# Patient Record
Sex: FEMALE | Race: WHITE | Employment: FULL TIME | URBAN - METROPOLITAN AREA
[De-identification: names, ages, dates, MRNs, and addresses within clinical notes are randomized per-mention and may not be internally consistent; named-entity substitution may affect disease eponyms.]

---

## 2017-07-14 ENCOUNTER — HOSPITAL ENCOUNTER (OUTPATIENT)
Dept: PREADMISSION TESTING | Age: 52
Discharge: HOME | End: 2017-07-14
Payer: COMMERCIAL

## 2017-07-14 VITALS
OXYGEN SATURATION: 98 % | DIASTOLIC BLOOD PRESSURE: 70 MMHG | BODY MASS INDEX: 28.85 KG/M2 | TEMPERATURE: 96.9 F | RESPIRATION RATE: 20 BRPM | SYSTOLIC BLOOD PRESSURE: 124 MMHG | WEIGHT: 169 LBS | HEART RATE: 64 BPM | HEIGHT: 64 IN

## 2017-07-14 DIAGNOSIS — M77.50 TENDONITIS OF FOOT: Chronic | ICD-10-CM

## 2017-07-14 DIAGNOSIS — R42 VERTIGO: ICD-10-CM

## 2017-07-14 LAB
HCT VFR BLD CALC: 42.2 % (ref 37–47)
HEMOGLOBIN: 14.3 G/DL (ref 12–16)
MCH RBC QN AUTO: 32.2 PG (ref 27–31.3)
MCHC RBC AUTO-ENTMCNC: 33.8 % (ref 33–37)
MCV RBC AUTO: 95.1 FL (ref 82–100)
PDW BLD-RTO: 14.8 % (ref 11.5–14.5)
PLATELET # BLD: 232 K/UL (ref 130–400)
RBC # BLD: 4.43 M/UL (ref 4.2–5.4)
WBC # BLD: 7.9 K/UL (ref 4.8–10.8)

## 2017-07-14 PROCEDURE — 36415 COLL VENOUS BLD VENIPUNCTURE: CPT

## 2017-07-14 PROCEDURE — 85027 COMPLETE CBC AUTOMATED: CPT

## 2017-07-14 PROCEDURE — 93005 ELECTROCARDIOGRAM TRACING: CPT

## 2017-07-14 RX ORDER — DULOXETIN HYDROCHLORIDE 30 MG/1
30 CAPSULE, DELAYED RELEASE ORAL
COMMUNITY

## 2017-07-14 RX ORDER — SODIUM CHLORIDE 0.9 % (FLUSH) 0.9 %
10 SYRINGE (ML) INJECTION EVERY 12 HOURS SCHEDULED
Status: CANCELLED | OUTPATIENT
Start: 2017-07-14

## 2017-07-14 RX ORDER — SODIUM CHLORIDE 0.9 % (FLUSH) 0.9 %
10 SYRINGE (ML) INJECTION PRN
Status: CANCELLED | OUTPATIENT
Start: 2017-07-14

## 2017-07-14 RX ORDER — LEVOTHYROXINE SODIUM 0.05 MG/1
50 TABLET ORAL
COMMUNITY
Start: 2014-04-16

## 2017-07-14 RX ORDER — LIDOCAINE HYDROCHLORIDE 10 MG/ML
1 INJECTION, SOLUTION EPIDURAL; INFILTRATION; INTRACAUDAL; PERINEURAL
Status: CANCELLED | OUTPATIENT
Start: 2017-07-14 | End: 2017-07-14

## 2017-07-14 RX ORDER — SODIUM CHLORIDE, SODIUM LACTATE, POTASSIUM CHLORIDE, CALCIUM CHLORIDE 600; 310; 30; 20 MG/100ML; MG/100ML; MG/100ML; MG/100ML
INJECTION, SOLUTION INTRAVENOUS CONTINUOUS
Status: CANCELLED | OUTPATIENT
Start: 2017-07-14

## 2017-07-14 RX ORDER — IBUPROFEN 200 MG
200 TABLET ORAL EVERY 6 HOURS PRN
COMMUNITY

## 2017-07-14 RX ORDER — ATORVASTATIN CALCIUM 20 MG/1
20 TABLET, FILM COATED ORAL DAILY
COMMUNITY

## 2017-07-14 ASSESSMENT — ENCOUNTER SYMPTOMS
EYES NEGATIVE: 1
NAUSEA: 0
VOMITING: 0
GASTROINTESTINAL NEGATIVE: 1
DIARRHEA: 0
COUGH: 1
WHEEZING: 0
SHORTNESS OF BREATH: 0
SORE THROAT: 0
CONSTIPATION: 0
EYE PAIN: 0
BACK PAIN: 0
BLURRED VISION: 0
SPUTUM PRODUCTION: 1
HEARTBURN: 0
HEMOPTYSIS: 0
DOUBLE VISION: 0

## 2017-07-19 LAB
EKG ATRIAL RATE: 57 BPM
EKG P AXIS: 48 DEGREES
EKG P-R INTERVAL: 128 MS
EKG Q-T INTERVAL: 414 MS
EKG QRS DURATION: 84 MS
EKG QTC CALCULATION (BAZETT): 402 MS
EKG R AXIS: 44 DEGREES
EKG T AXIS: 48 DEGREES
EKG VENTRICULAR RATE: 57 BPM

## 2024-08-19 ENCOUNTER — OFFICE VISIT (OUTPATIENT)
Dept: UROLOGY | Facility: CLINIC | Age: 59
End: 2024-08-19
Payer: COMMERCIAL

## 2024-08-19 VITALS
HEIGHT: 64 IN | DIASTOLIC BLOOD PRESSURE: 81 MMHG | HEART RATE: 64 BPM | SYSTOLIC BLOOD PRESSURE: 121 MMHG | WEIGHT: 174 LBS | TEMPERATURE: 98 F | BODY MASS INDEX: 29.71 KG/M2

## 2024-08-19 DIAGNOSIS — R31.29 OTHER MICROSCOPIC HEMATURIA: ICD-10-CM

## 2024-08-19 LAB
APPEARANCE UR: CLEAR
BILIRUB UR STRIP.AUTO-MCNC: NEGATIVE MG/DL
CAOX CRY #/AREA UR COMP ASSIST: NORMAL /HPF
COLOR UR: YELLOW
GLUCOSE UR STRIP.AUTO-MCNC: NORMAL MG/DL
KETONES UR STRIP.AUTO-MCNC: NEGATIVE MG/DL
LEUKOCYTE ESTERASE UR QL STRIP.AUTO: ABNORMAL
MUCOUS THREADS #/AREA URNS AUTO: NORMAL /LPF
NITRITE UR QL STRIP.AUTO: NEGATIVE
PH UR STRIP.AUTO: 5 [PH]
POC APPEARANCE, URINE: CLEAR
POC BILIRUBIN, URINE: NEGATIVE
POC BLOOD, URINE: ABNORMAL
POC COLOR, URINE: YELLOW
POC GLUCOSE, URINE: NEGATIVE MG/DL
POC KETONES, URINE: NEGATIVE MG/DL
POC LEUKOCYTES, URINE: ABNORMAL
POC NITRITE,URINE: NEGATIVE
POC PH, URINE: 5.5 PH
POC PROTEIN, URINE: NEGATIVE MG/DL
POC SPECIFIC GRAVITY, URINE: >=1.03
POC UROBILINOGEN, URINE: 0.2 EU/DL
PROT UR STRIP.AUTO-MCNC: NEGATIVE MG/DL
RBC # UR STRIP.AUTO: ABNORMAL /UL
RBC #/AREA URNS AUTO: NORMAL /HPF
SP GR UR STRIP.AUTO: 1.02
SQUAMOUS #/AREA URNS AUTO: NORMAL /HPF
UROBILINOGEN UR STRIP.AUTO-MCNC: NORMAL MG/DL
WBC #/AREA URNS AUTO: NORMAL /HPF

## 2024-08-19 PROCEDURE — 99213 OFFICE O/P EST LOW 20 MIN: CPT | Performed by: UROLOGY

## 2024-08-19 PROCEDURE — 3008F BODY MASS INDEX DOCD: CPT | Performed by: UROLOGY

## 2024-08-19 PROCEDURE — 4004F PT TOBACCO SCREEN RCVD TLK: CPT | Performed by: UROLOGY

## 2024-08-19 PROCEDURE — 81003 URINALYSIS AUTO W/O SCOPE: CPT | Performed by: UROLOGY

## 2024-08-19 PROCEDURE — 81001 URINALYSIS AUTO W/SCOPE: CPT | Performed by: UROLOGY

## 2024-08-19 PROCEDURE — 99203 OFFICE O/P NEW LOW 30 MIN: CPT | Performed by: UROLOGY

## 2024-08-19 RX ORDER — DULOXETIN HYDROCHLORIDE 60 MG/1
CAPSULE, DELAYED RELEASE ORAL
COMMUNITY
Start: 2024-06-17

## 2024-08-19 RX ORDER — METOPROLOL SUCCINATE 25 MG/1
1 TABLET, EXTENDED RELEASE ORAL
COMMUNITY
Start: 2024-08-06

## 2024-08-19 RX ORDER — INDOMETHACIN 25 MG/1
CAPSULE ORAL
COMMUNITY
Start: 2024-08-17

## 2024-08-19 RX ORDER — LEVOTHYROXINE SODIUM 50 UG/1
TABLET ORAL
COMMUNITY
Start: 2024-08-16

## 2024-08-19 RX ORDER — ROSUVASTATIN CALCIUM 40 MG/1
1 TABLET, COATED ORAL
COMMUNITY
Start: 2024-08-06

## 2024-08-19 SDOH — ECONOMIC STABILITY: FOOD INSECURITY: WITHIN THE PAST 12 MONTHS, YOU WORRIED THAT YOUR FOOD WOULD RUN OUT BEFORE YOU GOT MONEY TO BUY MORE.: NEVER TRUE

## 2024-08-19 SDOH — ECONOMIC STABILITY: FOOD INSECURITY: WITHIN THE PAST 12 MONTHS, THE FOOD YOU BOUGHT JUST DIDN'T LAST AND YOU DIDN'T HAVE MONEY TO GET MORE.: NEVER TRUE

## 2024-08-19 ASSESSMENT — PATIENT HEALTH QUESTIONNAIRE - PHQ9
SUM OF ALL RESPONSES TO PHQ9 QUESTIONS 1 AND 2: 0
2. FEELING DOWN, DEPRESSED OR HOPELESS: NOT AT ALL
1. LITTLE INTEREST OR PLEASURE IN DOING THINGS: NOT AT ALL

## 2024-08-19 ASSESSMENT — ENCOUNTER SYMPTOMS
LOSS OF SENSATION IN FEET: 0
DEPRESSION: 0
OCCASIONAL FEELINGS OF UNSTEADINESS: 0

## 2024-08-19 ASSESSMENT — LIFESTYLE VARIABLES
AUDIT-C TOTAL SCORE: 3
HOW OFTEN DO YOU HAVE SIX OR MORE DRINKS ON ONE OCCASION: NEVER
HOW OFTEN DO YOU HAVE A DRINK CONTAINING ALCOHOL: 2-3 TIMES A WEEK
SKIP TO QUESTIONS 9-10: 1
HOW MANY STANDARD DRINKS CONTAINING ALCOHOL DO YOU HAVE ON A TYPICAL DAY: 1 OR 2

## 2024-08-19 ASSESSMENT — COLUMBIA-SUICIDE SEVERITY RATING SCALE - C-SSRS
6. HAVE YOU EVER DONE ANYTHING, STARTED TO DO ANYTHING, OR PREPARED TO DO ANYTHING TO END YOUR LIFE?: NO
1. IN THE PAST MONTH, HAVE YOU WISHED YOU WERE DEAD OR WISHED YOU COULD GO TO SLEEP AND NOT WAKE UP?: NO
2. HAVE YOU ACTUALLY HAD ANY THOUGHTS OF KILLING YOURSELF?: NO

## 2024-08-19 ASSESSMENT — PAIN SCALES - GENERAL: PAINLEVEL: 0-NO PAIN

## 2024-08-19 NOTE — PROGRESS NOTES
Subjective   Patient ID: Bette Harmon is a 59 y.o. female who presents for New Patient Visit (Had blood in urine).   HP  PT PRESENTS  FOR  EVALUATION OF MICROSCOPIC HEMATURIA.   Are you experiencing:  Burning on urination -- NO  Pain on urination  -- NO  Urinary frequency -- NO  Urinary urgency -- OCC  Urge incontinence -- OCC   NO ENEURESIS  -- NONE  Nocturia-- NEVER  Hematuria -- NO  Hesitancy -- NO  Post void fullness --  NO     Review of Systems  General-- No C/O fever or chills  Head-- No C/O Dizziness  Eyes-- NO  C/O blurry or double vision  Ears-- No C/O hearing loss  Neck-- Supple  Chest-- No C/O pain or discomfort  Lungs-- No C/O shortness of breath  Abdomen-- No C/O  pain or discomfort, No nausea or vomiting  Back-- No C/O back pain or discomfort  Extremities-- No C/O swelling or pain    Objective   Physical Exam    General-- well developed, well nourished in NAD  Head-- normal cephalic, atraumatic  Eyes-- PERRL, EOM'S FROM,  no  jaundice  Neck-- Supple, without masses  Chest-- Normal bony structure  Abdomen-- soft, non tender, liver spleen not palpable . No supra pubic masses  Back-- no flank masses palpable, no CVA tenderness on palpation or perc;ussion  Lymph nodes-- No inguinal lymphadenopathy noted  Scrotum -- no hydrocele noted  Extremities -- Normal muscle mass and tone for the patients age.  RIGHT KNEE IS SWOLLEN  Neurological-- oriented times three    8-19-24  U/A DIPSTICK-- TRACE HEMOGLOBINURIA  MICRO-- 1-2 RBC'S 1-5 WBC'S     TOBACCO   1/2 PACK PER DAY--  ALSO SMOKES    Assessment/Plan   A:  SCANT RBC'S ON TODAY'S U/A  PATHOPHYSIOLOGY OF THE ABOVE DISCUSSED IN DETAIL WITH THE PT  OPTIONS OF FURTHER W/U DISCUSSED  ALL QUESTIONS ANSWERED.    P:  U/A WITH MICRO CK IN 2 & 4 WEEKS  F/U IN 6 WEEKS  IF RBC'S PERSIST WILL GO FORWARD WITH A CAT SCAN OF THE ABD AND PELVIS FOLLOWED BY A CYSTO    Gonsalo ISMALE Espinal MD 08/19/24 10:41 AM

## 2024-08-19 NOTE — LETTER
August 20, 2024     Marty Singh MD  [address]  [address]  [facility] [address]    Patient: Bette Harmon   YOB: 1965   Date of Visit: 8/19/2024       Dear Dr. Marty Singh MD:    Thank you for referring Bette Harmon to me for evaluation. Below are my notes for this consultation.  If you have questions, please do not hesitate to call me. I look forward to following your patient along with you.       Sincerely,     Gonsalo Espinal MD      CC: No Recipients  ______________________________________________________________________________________    Subjective  Patient ID: Bette Harmon is a 59 y.o. female who presents for New Patient Visit (Had blood in urine).   HP  PT PRESENTS  FOR  EVALUATION OF MICROSCOPIC HEMATURIA.   Are you experiencing:  Burning on urination -- NO  Pain on urination  -- NO  Urinary frequency -- NO  Urinary urgency -- OCC  Urge incontinence -- OCC   NO ENEURESIS  -- NONE  Nocturia-- NEVER  Hematuria -- NO  Hesitancy -- NO  Post void fullness --  NO     Review of Systems  General-- No C/O fever or chills  Head-- No C/O Dizziness  Eyes-- NO  C/O blurry or double vision  Ears-- No C/O hearing loss  Neck-- Supple  Chest-- No C/O pain or discomfort  Lungs-- No C/O shortness of breath  Abdomen-- No C/O  pain or discomfort, No nausea or vomiting  Back-- No C/O back pain or discomfort  Extremities-- No C/O swelling or pain    Objective   Physical Exam    General-- well developed, well nourished in NAD  Head-- normal cephalic, atraumatic  Eyes-- PERRL, EOM'S FROM,  no  jaundice  Neck-- Supple, without masses  Chest-- Normal bony structure  Abdomen-- soft, non tender, liver spleen not palpable . No supra pubic masses  Back-- no flank masses palpable, no CVA tenderness on palpation or perc;ussion  Lymph nodes-- No inguinal lymphadenopathy noted  Scrotum -- no hydrocele noted  Extremities -- Normal muscle mass and tone for the patients age.  RIGHT KNEE IS SWOLLEN  Neurological-- oriented  times three    8-19-24  U/A DIPSTICK-- TRACE HEMOGLOBINURIA  MICRO-- 1-2 RBC'S 1-5 WBC'S     TOBACCO   1/2 PACK PER DAY--  ALSO SMOKES    Assessment/Plan   A:  SCANT RBC'S ON TODAY'S U/A  PATHOPHYSIOLOGY OF THE ABOVE DISCUSSED IN DETAIL WITH THE PT  OPTIONS OF FURTHER W/U DISCUSSED  ALL QUESTIONS ANSWERED.    P:  U/A WITH MICRO CK IN 2 & 4 WEEKS  F/U IN 6 WEEKS  IF RBC'S PERSIST WILL GO FORWARD WITH A CAT SCAN OF THE ABD AND PELVIS FOLLOWED BY A CYSTO    Gonsalo Espinal MD 08/19/24 10:41 AM

## 2024-09-05 ENCOUNTER — LAB (OUTPATIENT)
Dept: LAB | Facility: LAB | Age: 59
End: 2024-09-05
Payer: COMMERCIAL

## 2024-09-05 DIAGNOSIS — R31.29 OTHER MICROSCOPIC HEMATURIA: ICD-10-CM

## 2024-09-05 LAB
APPEARANCE UR: ABNORMAL
BACTERIA #/AREA URNS AUTO: ABNORMAL /HPF
BILIRUB UR STRIP.AUTO-MCNC: NEGATIVE MG/DL
COLOR UR: YELLOW
GLUCOSE UR STRIP.AUTO-MCNC: NORMAL MG/DL
KETONES UR STRIP.AUTO-MCNC: NEGATIVE MG/DL
LEUKOCYTE ESTERASE UR QL STRIP.AUTO: ABNORMAL
MUCOUS THREADS #/AREA URNS AUTO: ABNORMAL /LPF
NITRITE UR QL STRIP.AUTO: NEGATIVE
PH UR STRIP.AUTO: 5 [PH]
PROT UR STRIP.AUTO-MCNC: NEGATIVE MG/DL
RBC # UR STRIP.AUTO: NEGATIVE /UL
RBC #/AREA URNS AUTO: ABNORMAL /HPF
SP GR UR STRIP.AUTO: 1.02
SQUAMOUS #/AREA URNS AUTO: ABNORMAL /HPF
UROBILINOGEN UR STRIP.AUTO-MCNC: NORMAL MG/DL
WBC #/AREA URNS AUTO: ABNORMAL /HPF

## 2024-09-05 PROCEDURE — 81001 URINALYSIS AUTO W/SCOPE: CPT

## 2024-09-30 ENCOUNTER — APPOINTMENT (OUTPATIENT)
Dept: UROLOGY | Facility: CLINIC | Age: 59
End: 2024-09-30
Payer: COMMERCIAL

## 2024-09-30 DIAGNOSIS — R31.29 OTHER MICROSCOPIC HEMATURIA: ICD-10-CM

## 2025-08-03 NOTE — PROGRESS NOTES
Subjective   Patient ID: Bette Harmon is a 60 y.o. female who presents for PT PRESENTS  FOR  RE-EVALUATION OF HER H/O  MICROSCOPIC HEMATURIA.  PT LAST SEEN  8-19-24 FOR THE SAME PROBLEM.     HPI:  Are you experiencing:  Burning on urination -- NO  Pain on urination  -- NO  Urinary frequency -- NO  Urinary urgency -- OCC  Urge incontinence -- OCC   NO ENEURESIS  -- NONE  Nocturia-- NEVER  Hematuria -- NO  Hesitancy -- NO  Post void fullness --  NO      Review of Systems  General-- No C/O fever or chills  Head-- No C/O Dizziness  Eyes-- NO  C/O blurry or double vision  Ears-- No C/O hearing loss  Neck--No C/O pain or discomfort  Chest-- No C/O pain or discomfort  Lungs-- No C/O shortness of breath  Abdomen-- No C/O  pain or discomfort, No nausea or vomiting  Back-- No C/O back pain or discomfort  Extremities-- No C/O swelling or pain     Objective   Physical Exam   General-- well developed, well nourished in NAD  Head-- normal cephalic, atraumatic  Eyes-- PERRL, EOM'S FROM,  no  jaundice  Neck-- Supple, without masses  Chest-- Normal bony structure  Abdomen-- soft, non tender, liver spleen not palpable . No supra pubic masses  Back-- no flank masses palpable, no CVA tenderness on palpation or perc;ussion  Lymph nodes-- No inguinal lymphadenopathy noted  Extremities -- Normal muscle mass and tone for the patients age.  RIGHT KNEE IS SWOLLEN  Neurological-- oriented times three     8-19-24  U/A DIPSTICK-- TRACE HEMOGLOBINURIA  MICRO-- 1-2 RBC'S 1-5 WBC'S   9-5-24  U/A DIPSTICK--1+ LEUKOCYTES, NEG HEMOGLOBINURIA,   MICRO-- 1-2 RBC'S,11-20 WBC'S     TOBACCO   1/2 PACK PER DAY--  ALSO SMOKES     Assessment/Plan   A:     P:    F/U IN 6 WEEKS  IF RBC'S PERSIST WILL GO FORWARD WITH A CAT SCAN OF THE ABD AND PELVIS FOLLOWED BY A CYSTO     Gonsalo Espinal MD 08/03/25 11:49 AM    YEAR  PATHOPHYSIOLOGY OF THE ABOVE AND OPTIONS OF FUTHER THERAPY AND EVALUATION DISCUSSED IN DETAIL WITH THE PT   ALL QUESTIONS ANSWERED   P:  REASSURANCE, EDUCATION, SUPPORTIVE CARE RENDERED TO THE PT   WILL CONTINUE TO CLOSELY WATCH HER U/A OVER TIME   F/U IN 6 MONTHS      Gonsalo Espinal MD 08/03/25 11:49 AM

## 2025-08-04 ENCOUNTER — APPOINTMENT (OUTPATIENT)
Age: 60
End: 2025-08-04
Payer: COMMERCIAL

## 2025-08-04 VITALS
SYSTOLIC BLOOD PRESSURE: 121 MMHG | BODY MASS INDEX: 31.41 KG/M2 | WEIGHT: 183 LBS | DIASTOLIC BLOOD PRESSURE: 79 MMHG | HEART RATE: 76 BPM | TEMPERATURE: 98.1 F

## 2025-08-04 DIAGNOSIS — R39.9 URINARY SYMPTOM OR SIGN: Primary | ICD-10-CM

## 2025-08-04 DIAGNOSIS — N39.3 URINARY, INCONTINENCE, STRESS FEMALE: ICD-10-CM

## 2025-08-04 LAB
POC APPEARANCE, URINE: CLEAR
POC BILIRUBIN, URINE: NEGATIVE
POC BLOOD, URINE: ABNORMAL
POC COLOR, URINE: YELLOW
POC GLUCOSE, URINE: NEGATIVE MG/DL
POC KETONES, URINE: NEGATIVE MG/DL
POC LEUKOCYTES, URINE: NEGATIVE
POC NITRITE,URINE: NEGATIVE
POC PH, URINE: 5.5 PH
POC PROTEIN, URINE: NEGATIVE MG/DL
POC SPECIFIC GRAVITY, URINE: >=1.03
POC UROBILINOGEN, URINE: 0.2 EU/DL

## 2025-08-04 PROCEDURE — 99213 OFFICE O/P EST LOW 20 MIN: CPT | Performed by: UROLOGY

## 2025-08-04 PROCEDURE — 81003 URINALYSIS AUTO W/O SCOPE: CPT | Performed by: UROLOGY

## 2025-08-04 NOTE — LETTER
August 5, 2025     Marty Singh MD  [address]  [address]  [facility] [address]    Patient: Bette Harmon   YOB: 1965   Date of Visit: 8/4/2025       Dear Dr. Marty Singh MD:    Thank you for referring Bette Harmon to me for evaluation. Below are my notes for this consultation.  If you have questions, please do not hesitate to call me. I look forward to following your patient along with you.       Sincerely,     Gonsalo Espinal MD      CC: No Recipients  ______________________________________________________________________________________    Subjective  Patient ID: Bette Harmon is a 60 y.o. female who presents for PT PRESENTS  FOR  RE-EVALUATION OF HER H/O  MICROSCOPIC HEMATURIA.  PT LAST SEEN  8-19-24 FOR THE SAME PROBLEM.     HPI:  Are you experiencing:  Burning on urination -- NO  Pain on urination  -- NO  Urinary frequency -- NO  Urinary urgency -- NO  Urge incontinence -- NO  Urinary stress incontinence-- YES  ENURESIS-- NONE  Nocturia-- NO  Hematuria -- NO  Hesitancy -- NO  Post void fullness --  NO   Strength of stream-- NORMAL      Review of Systems  General-- No C/O fever or chills  Head-- No C/O Dizziness  Eyes-- NO  C/O blurry or double vision  Ears-- No C/O hearing loss  Neck--No C/O pain or discomfort  Chest-- No C/O pain or discomfort  Lungs-- No C/O shortness of breath  Abdomen-- No C/O  pain or discomfort, No nausea or vomiting  Back-- No C/O back pain or discomfort  Extremities-- No C/O swelling or pain     Objective   Physical Exam   General-- well developed, well nourished in NAD  Head-- normal cephalic, atraumatic  Eyes-- PERRL, EOM'S FROM,  no  jaundice  Neck-- Supple, without masses  Chest-- Normal bony structure  Abdomen-- soft, non tender, liver spleen not palpable . No supra pubic masses  Back-- no flank masses palpable, no CVA tenderness on palpation or perc;ussion  Lymph nodes-- No inguinal lymphadenopathy noted  Extremities -- Normal muscle mass and tone for the  patients age.  RIGHT KNEE IS SWOLLEN  Neurological-- oriented times three    8-19-24  U/A DIPSTICK-- TRACE OF HEMOGLOBINURIA  MICRO-- 1-2 RBC'S 1-5 WBC'S   9-5-24  U/A DIPSTICK--1+ LEUKOCYTES, NEG HEMOGLOBINURIA,   MICRO-- 1-2 RBC'S,11-20 WBC'S  8-4-25  URINALYSIS DIPSTICK-- TRACE OF KETONES, TRACE OF LEUKOCYTE  ESTERASE, NEG FOR HEMOGLOBINURIA   MICRO--6--10 WBC'S,0-5 RBC'S, 0-2 RBC'S    TOBACCO   1/2 PACK PER DAY--  ALSO SMOKES     Assessment/Plan   A:  HEMOGLOBINURIA WITH A TRACE OF OCC RBC'S SEEN ON MICROSCOPY-- NO CHANGE IN THE U/A OVER THE PAST YEAR  PATHOPHYSIOLOGY OF THE ABOVE AND OPTIONS OF FUTHER THERAPY AND EVALUATION DISCUSSED IN DETAIL WITH THE PT   ALL QUESTIONS ANSWERED   P:  REASSURANCE, EDUCATION, SUPPORTIVE CARE RENDERED TO THE PT   WILL CONTINUE TO CLOSELY WATCH HER U/A OVER TIME   F/U IN 6 MONTHS      Gonsalo Espinal MD 08/03/25 11:49 AM

## 2025-08-05 LAB
APPEARANCE UR: CLEAR
BACTERIA #/AREA URNS HPF: ABNORMAL /HPF
BILIRUB UR QL STRIP: NEGATIVE
COLOR UR: YELLOW
GLUCOSE UR QL STRIP: NEGATIVE
HGB UR QL STRIP: NEGATIVE
HYALINE CASTS #/AREA URNS LPF: ABNORMAL /LPF
KETONES UR QL STRIP: ABNORMAL
LEUKOCYTE ESTERASE UR QL STRIP: ABNORMAL
NITRITE UR QL STRIP: NEGATIVE
PH UR STRIP: ABNORMAL [PH] (ref 5–8)
PROT UR QL STRIP: NEGATIVE
RBC #/AREA URNS HPF: ABNORMAL /HPF
SERVICE CMNT-IMP: ABNORMAL
SP GR UR STRIP: 1.02 (ref 1–1.03)
SQUAMOUS #/AREA URNS HPF: ABNORMAL /HPF
WBC #/AREA URNS HPF: ABNORMAL /HPF

## 2025-08-20 PROBLEM — F32.A DEPRESSION: Status: ACTIVE | Noted: 2021-08-29

## 2025-08-20 PROBLEM — M25.579 ANKLE PAIN: Status: ACTIVE | Noted: 2025-08-20

## 2025-08-20 PROBLEM — H81.10 BENIGN PAROXYSMAL POSITIONAL VERTIGO: Status: ACTIVE | Noted: 2025-05-14

## 2025-08-20 PROBLEM — E03.9 HYPOTHYROIDISM: Status: ACTIVE | Noted: 2021-08-29

## 2025-08-20 PROBLEM — E78.5 HYPERLIPIDEMIA: Status: ACTIVE | Noted: 2025-08-20

## 2025-08-20 PROBLEM — R00.2 PALPITATIONS: Status: ACTIVE | Noted: 2025-08-20

## 2025-08-20 PROBLEM — N95.0 POSTMENOPAUSAL BLEEDING: Status: ACTIVE | Noted: 2025-08-20

## 2025-08-20 PROBLEM — F17.200 SMOKER: Status: ACTIVE | Noted: 2019-09-23

## 2025-08-20 PROBLEM — E66.811 CLASS 1 OBESITY WITH BODY MASS INDEX (BMI) OF 31.0 TO 31.9 IN ADULT: Status: ACTIVE | Noted: 2025-08-20

## 2025-08-20 PROBLEM — E78.00 PURE HYPERCHOLESTEROLEMIA: Status: ACTIVE | Noted: 2019-09-23

## 2025-08-20 PROBLEM — M25.674: Status: ACTIVE | Noted: 2025-08-20

## 2025-08-20 PROBLEM — M25.675: Status: ACTIVE | Noted: 2025-08-20

## 2025-08-20 PROBLEM — M79.2 NEUROPATHIC PAIN: Status: ACTIVE | Noted: 2025-08-20

## 2025-08-20 PROBLEM — R55 SYNCOPE: Status: ACTIVE | Noted: 2025-08-20

## 2025-08-20 PROBLEM — N64.89 BREAST ASYMMETRY: Status: ACTIVE | Noted: 2025-08-20

## 2025-08-20 PROBLEM — I95.1 ORTHOSTATIC INTOLERANCE: Status: ACTIVE | Noted: 2025-08-20

## 2025-08-20 PROBLEM — Z98.890 S/P FOOT SURGERY, LEFT: Status: ACTIVE | Noted: 2025-08-20

## 2025-08-20 PROBLEM — R03.0 ELEVATED BLOOD PRESSURE READING WITHOUT DIAGNOSIS OF HYPERTENSION: Status: ACTIVE | Noted: 2025-08-20

## 2025-08-20 PROBLEM — R26.9 ABNORMAL GAIT: Status: ACTIVE | Noted: 2025-08-20

## 2025-08-21 ENCOUNTER — OFFICE VISIT (OUTPATIENT)
Dept: CARDIOLOGY | Facility: CLINIC | Age: 60
End: 2025-08-21
Payer: COMMERCIAL

## 2025-08-21 VITALS
WEIGHT: 182.2 LBS | HEIGHT: 64 IN | OXYGEN SATURATION: 97 % | DIASTOLIC BLOOD PRESSURE: 80 MMHG | SYSTOLIC BLOOD PRESSURE: 124 MMHG | HEART RATE: 74 BPM | BODY MASS INDEX: 31.1 KG/M2

## 2025-08-21 DIAGNOSIS — R00.2 PALPITATIONS: ICD-10-CM

## 2025-08-21 DIAGNOSIS — R06.09 EXERTIONAL DYSPNEA: ICD-10-CM

## 2025-08-21 DIAGNOSIS — I47.10 PSVT (PAROXYSMAL SUPRAVENTRICULAR TACHYCARDIA): ICD-10-CM

## 2025-08-21 DIAGNOSIS — F17.200 SMOKER: ICD-10-CM

## 2025-08-21 DIAGNOSIS — E78.5 HYPERLIPIDEMIA, UNSPECIFIED HYPERLIPIDEMIA TYPE: Primary | ICD-10-CM

## 2025-08-21 DIAGNOSIS — I10 ESSENTIAL (PRIMARY) HYPERTENSION: ICD-10-CM

## 2025-08-21 PROBLEM — R03.0 ELEVATED BLOOD PRESSURE READING WITHOUT DIAGNOSIS OF HYPERTENSION: Status: RESOLVED | Noted: 2025-08-20 | Resolved: 2025-08-21

## 2025-08-21 PROBLEM — E78.00 PURE HYPERCHOLESTEROLEMIA: Status: RESOLVED | Noted: 2019-09-23 | Resolved: 2025-08-21

## 2025-08-21 PROBLEM — I95.1 ORTHOSTATIC INTOLERANCE: Status: RESOLVED | Noted: 2025-08-20 | Resolved: 2025-08-21

## 2025-08-21 LAB
ATRIAL RATE: 65 BPM
P AXIS: 46 DEGREES
P OFFSET: 216 MS
P ONSET: 152 MS
PR INTERVAL: 146 MS
Q ONSET: 225 MS
QRS COUNT: 11 BEATS
QRS DURATION: 84 MS
QT INTERVAL: 410 MS
QTC CALCULATION(BAZETT): 426 MS
QTC FREDERICIA: 420 MS
R AXIS: 28 DEGREES
T AXIS: 58 DEGREES
T OFFSET: 430 MS
VENTRICULAR RATE: 65 BPM

## 2025-08-21 PROCEDURE — 3008F BODY MASS INDEX DOCD: CPT | Performed by: INTERNAL MEDICINE

## 2025-08-21 PROCEDURE — 99214 OFFICE O/P EST MOD 30 MIN: CPT | Performed by: INTERNAL MEDICINE

## 2025-08-21 PROCEDURE — 99212 OFFICE O/P EST SF 10 MIN: CPT

## 2025-08-21 PROCEDURE — 3079F DIAST BP 80-89 MM HG: CPT | Performed by: INTERNAL MEDICINE

## 2025-08-21 PROCEDURE — 93005 ELECTROCARDIOGRAM TRACING: CPT | Performed by: INTERNAL MEDICINE

## 2025-08-21 PROCEDURE — 3074F SYST BP LT 130 MM HG: CPT | Performed by: INTERNAL MEDICINE

## 2025-08-21 ASSESSMENT — ENCOUNTER SYMPTOMS: DYSPNEA ON EXERTION: 1
